# Patient Record
Sex: FEMALE | URBAN - METROPOLITAN AREA
[De-identification: names, ages, dates, MRNs, and addresses within clinical notes are randomized per-mention and may not be internally consistent; named-entity substitution may affect disease eponyms.]

---

## 2022-12-06 ENCOUNTER — APPOINTMENT (OUTPATIENT)
Dept: URBAN - METROPOLITAN AREA CLINIC 199 | Age: 62
Setting detail: DERMATOLOGY
End: 2022-12-06

## 2022-12-06 DIAGNOSIS — D485 NEOPLASM OF UNCERTAIN BEHAVIOR OF SKIN: ICD-10-CM

## 2022-12-06 PROBLEM — D48.5 NEOPLASM OF UNCERTAIN BEHAVIOR OF SKIN: Status: ACTIVE | Noted: 2022-12-06

## 2022-12-06 PROCEDURE — OTHER COUNSELING: OTHER

## 2022-12-06 PROCEDURE — 99202 OFFICE O/P NEW SF 15 MIN: CPT

## 2022-12-06 PROCEDURE — OTHER ORDER ULTRASOUND: OTHER

## 2022-12-06 ASSESSMENT — LOCATION ZONE DERM: LOCATION ZONE: FEET

## 2022-12-06 ASSESSMENT — LOCATION SIMPLE DESCRIPTION DERM: LOCATION SIMPLE: LEFT PLANTAR SURFACE

## 2022-12-06 ASSESSMENT — LOCATION DETAILED DESCRIPTION DERM: LOCATION DETAILED: LEFT MEDIAL PLANTAR MIDFOOT

## 2022-12-06 NOTE — PROCEDURE: COUNSELING
Patient Specific Counseling (Will Not Stick From Patient To Patient): \\nx3 days on left medial plantar foot. 3mm firm subq nodule\\n-reviewed that I am no positive what this could be. Reviewed ddx cyst v foreign body granuloma. Overlying veins seen so would like to image in case this is a small AV malformation \\n-advised ultrasound to better eval lesion prior to anything procedural. Ultrasound order provided today\\n-advised warm compresses, ibuprofen to see if this will resolve the small lesion\\n-rtc few weeks for eval. She will call if there are any major changes
Detail Level: Detailed

## 2022-12-06 NOTE — PROCEDURE: ORDER ULTRASOUND
Ultrasound Protocol: Ultrasound of Subcutaneous Mass
Detail Level: Simple
Subcutaneous Lesion Ultrasound Reason: Cyst vs foreign body granuloma vs venous malformation
Priority: normal
Provider: Leta Mcclure MD
Lesion Location: left plantar foot instep